# Patient Record
Sex: FEMALE | Race: WHITE
[De-identification: names, ages, dates, MRNs, and addresses within clinical notes are randomized per-mention and may not be internally consistent; named-entity substitution may affect disease eponyms.]

---

## 2017-08-28 ENCOUNTER — HOSPITAL ENCOUNTER (EMERGENCY)
Dept: HOSPITAL 12 - ER | Age: 47
Discharge: HOME | End: 2017-08-28
Payer: COMMERCIAL

## 2017-08-28 VITALS — SYSTOLIC BLOOD PRESSURE: 136 MMHG | DIASTOLIC BLOOD PRESSURE: 87 MMHG

## 2017-08-28 VITALS — BODY MASS INDEX: 26.31 KG/M2 | HEIGHT: 62 IN | WEIGHT: 143 LBS

## 2017-08-28 DIAGNOSIS — L02.212: ICD-10-CM

## 2017-08-28 DIAGNOSIS — Z48.01: Primary | ICD-10-CM

## 2017-08-28 PROCEDURE — A4663 DIALYSIS BLOOD PRESSURE CUFF: HCPCS

## 2017-08-28 PROCEDURE — 99282 EMERGENCY DEPT VISIT SF MDM: CPT

## 2017-08-28 NOTE — NUR
DR ENRIQUE INSPECTED PT's WOUND. GAUZE DRESSING WAS APPLIED. NO BLEEDING. PT 
WAS D/C TO HOME. D/C INSTRUCTIONS GIVEN MARIO PT.

## 2017-09-17 ENCOUNTER — HOSPITAL ENCOUNTER (EMERGENCY)
Dept: HOSPITAL 12 - ER | Age: 47
Discharge: HOME | End: 2017-09-17
Payer: COMMERCIAL

## 2017-09-17 VITALS — DIASTOLIC BLOOD PRESSURE: 69 MMHG | SYSTOLIC BLOOD PRESSURE: 114 MMHG

## 2017-09-17 VITALS — WEIGHT: 145 LBS | BODY MASS INDEX: 27.38 KG/M2 | HEIGHT: 61 IN

## 2017-09-17 DIAGNOSIS — Z98.890: ICD-10-CM

## 2017-09-17 DIAGNOSIS — L02.212: Primary | ICD-10-CM

## 2017-09-17 PROCEDURE — A4663 DIALYSIS BLOOD PRESSURE CUFF: HCPCS

## 2017-09-17 PROCEDURE — 99281 EMR DPT VST MAYX REQ PHY/QHP: CPT

## 2019-06-07 ENCOUNTER — HOSPITAL ENCOUNTER (EMERGENCY)
Dept: HOSPITAL 12 - ER | Age: 49
Discharge: HOME | End: 2019-06-07
Payer: COMMERCIAL

## 2019-06-07 VITALS — BODY MASS INDEX: 22.15 KG/M2 | WEIGHT: 125 LBS | HEIGHT: 63 IN

## 2019-06-07 VITALS — DIASTOLIC BLOOD PRESSURE: 70 MMHG | SYSTOLIC BLOOD PRESSURE: 122 MMHG
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
## 2019-06-07 DIAGNOSIS — S92.311A: Primary | ICD-10-CM
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
## 2019-06-07 DIAGNOSIS — S92.321A: ICD-10-CM
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
## 2019-06-07 PROCEDURE — A4663 DIALYSIS BLOOD PRESSURE CUFF: HCPCS

## 2019-06-07 NOTE — NUR
Patient is AOx4, came with her own crutches. Pt reinstructed on proper use of 
crutches.  Patient able to demonstrate correct use of crutches.  Patient 
discharged to home in stable conditon.  Written and verbal after care 
instructions given to patient. Patient verbalizes understanding of 
instructions.